# Patient Record
Sex: MALE
[De-identification: names, ages, dates, MRNs, and addresses within clinical notes are randomized per-mention and may not be internally consistent; named-entity substitution may affect disease eponyms.]

---

## 2018-02-14 NOTE — CT
CT ABDOMEN AND PELVIS WITH IV CONTRAST

2/14/18

 

HISTORY: 

Right sided abdominal pain and painful urination. 

 

FINDINGS:  

There is mild to moderate right hydronephrosis and hydroureter with an obstructing calculus seen at t
he most distal aspect of the right UVJ measuring 5 mm. Just proximal to this larger calculus within t
he distal right ureter is a 4 mm calculus present. There are a few nonobstructing right renal calculi
 identified. Largest within the renal pelvis which measures approximately 11 mm x 5 mm. 

 

There is mild right perinephric stranding.

 

No left renal or ureteral calculus is visualized. Vascular calcifications are seen in the coronary ar
teries and to a lesser extent infrarenal abdominal aorta. 

 

Calcified subcarinal lymph node is seen. 

 

There is mild elevation of the right hemidiaphragm with atelectasis present at the right lung base. T
he left lung base is clear. 

 

Degenerative changes are noted in the spine. Calcified granulomata are seen in the spleen. 

 

The liver, pancreas, bilateral adrenal glands and left kidney demonstrate a normal CT appearance.

 

There is colonic diverticulosis. 

 

The appendix is not visualized, but there are no secondary signs to suggest appendicitis. 

 

No free fluid, fluid collection, or lymphadenopathy seen in the abdomen or pelvis. 

 

IMPRESSION:  

1.      Partially obstructing approximately 5 mm calculus at the right UVJ with a 4 mm calculus seen 
in the distal right ureter. There is mild to moderate right hydronephrosis. 

2.      Nonobstructing right renal calculi. 

3.      Colonic diverticulosis.

1.      

 

POS: [facility]

## 2019-03-20 ENCOUNTER — HOSPITAL ENCOUNTER (EMERGENCY)
Dept: HOSPITAL 92 - ERS | Age: 59
Discharge: HOME | End: 2019-03-20
Payer: MEDICARE

## 2019-03-20 DIAGNOSIS — I10: ICD-10-CM

## 2019-03-20 DIAGNOSIS — Z79.899: ICD-10-CM

## 2019-03-20 DIAGNOSIS — E11.9: ICD-10-CM

## 2019-03-20 DIAGNOSIS — Z79.84: ICD-10-CM

## 2019-03-20 DIAGNOSIS — E78.5: ICD-10-CM

## 2019-03-20 DIAGNOSIS — M23.92: Primary | ICD-10-CM

## 2019-03-20 DIAGNOSIS — F41.9: ICD-10-CM

## 2019-03-20 PROCEDURE — 96374 THER/PROPH/DIAG INJ IV PUSH: CPT

## 2019-03-20 NOTE — RAD
LEFT KNEE FOUR VIEWS:

3/20/19

 

HISTORY: 

Injury left knee pain. 

 

FINDINGS/IMPRESSION:  

Degenerative changes are seen. No acute fracture or dislocation is identified. There is fullness in t
he suprapatellar pouch suspicious for a joint effusion. 

 

 

 

POS: [facility]

## 2019-07-03 ENCOUNTER — HOSPITAL ENCOUNTER (EMERGENCY)
Dept: HOSPITAL 92 - ERS | Age: 59
Discharge: HOME | End: 2019-07-03
Payer: MEDICARE

## 2019-07-03 DIAGNOSIS — Z87.442: ICD-10-CM

## 2019-07-03 DIAGNOSIS — R00.0: ICD-10-CM

## 2019-07-03 DIAGNOSIS — E78.5: ICD-10-CM

## 2019-07-03 DIAGNOSIS — I10: ICD-10-CM

## 2019-07-03 DIAGNOSIS — G51.0: ICD-10-CM

## 2019-07-03 DIAGNOSIS — R10.9: ICD-10-CM

## 2019-07-03 DIAGNOSIS — F41.9: ICD-10-CM

## 2019-07-03 DIAGNOSIS — Z79.899: ICD-10-CM

## 2019-07-03 DIAGNOSIS — R07.89: Primary | ICD-10-CM

## 2019-07-03 LAB
ALBUMIN SERPL BCG-MCNC: 3.9 G/DL (ref 3.5–5)
ALP SERPL-CCNC: 92 U/L (ref 40–150)
ALT SERPL W P-5'-P-CCNC: 85 U/L (ref 8–55)
ANION GAP SERPL CALC-SCNC: 15 MMOL/L (ref 10–20)
AST SERPL-CCNC: 128 U/L (ref 5–34)
BASOPHILS # BLD AUTO: 0 THOU/UL (ref 0–0.2)
BASOPHILS NFR BLD AUTO: 0.3 % (ref 0–1)
BILIRUB SERPL-MCNC: 1 MG/DL (ref 0.2–1.2)
BUN SERPL-MCNC: 13 MG/DL (ref 8.4–25.7)
CALCIUM SERPL-MCNC: 10.3 MG/DL (ref 7.8–10.44)
CHLORIDE SERPL-SCNC: 103 MMOL/L (ref 98–107)
CO2 SERPL-SCNC: 25 MMOL/L (ref 22–29)
CREAT CL PREDICTED SERPL C-G-VRATE: 0 ML/MIN (ref 70–130)
EOSINOPHIL # BLD AUTO: 0.2 THOU/UL (ref 0–0.7)
EOSINOPHIL NFR BLD AUTO: 1.4 % (ref 0–10)
GLOBULIN SER CALC-MCNC: 3.1 G/DL (ref 2.4–3.5)
GLUCOSE SERPL-MCNC: 188 MG/DL (ref 70–105)
HGB BLD-MCNC: 17.5 G/DL (ref 14–18)
LYMPHOCYTES # BLD: 0.9 THOU/UL (ref 1.2–3.4)
LYMPHOCYTES NFR BLD AUTO: 8.6 % (ref 21–51)
MCH RBC QN AUTO: 30.1 PG (ref 27–31)
MCV RBC AUTO: 89.5 FL (ref 78–98)
MONOCYTES # BLD AUTO: 0.7 THOU/UL (ref 0.11–0.59)
MONOCYTES NFR BLD AUTO: 6.2 % (ref 0–10)
NEUTROPHILS # BLD AUTO: 8.9 THOU/UL (ref 1.4–6.5)
NEUTROPHILS NFR BLD AUTO: 83.4 % (ref 42–75)
PLATELET # BLD AUTO: 137 THOU/UL (ref 130–400)
POTASSIUM SERPL-SCNC: 4.2 MMOL/L (ref 3.5–5.1)
RBC # BLD AUTO: 5.83 MILL/UL (ref 4.7–6.1)
SODIUM SERPL-SCNC: 139 MMOL/L (ref 136–145)
WBC # BLD AUTO: 10.6 THOU/UL (ref 4.8–10.8)

## 2019-07-03 PROCEDURE — 96360 HYDRATION IV INFUSION INIT: CPT

## 2019-07-03 PROCEDURE — 71046 X-RAY EXAM CHEST 2 VIEWS: CPT

## 2019-07-03 PROCEDURE — 84484 ASSAY OF TROPONIN QUANT: CPT

## 2019-07-03 PROCEDURE — 71275 CT ANGIOGRAPHY CHEST: CPT

## 2019-07-03 PROCEDURE — 93005 ELECTROCARDIOGRAM TRACING: CPT

## 2019-07-03 PROCEDURE — 85025 COMPLETE CBC W/AUTO DIFF WBC: CPT

## 2019-07-03 PROCEDURE — 36415 COLL VENOUS BLD VENIPUNCTURE: CPT

## 2019-07-03 PROCEDURE — 80053 COMPREHEN METABOLIC PANEL: CPT

## 2019-07-03 NOTE — CT
EXAM: CTA of the chest and abdomen



HISTORY: Pain in the gut and neck for 1 to 2 weeks.



COMPARISON: None



TECHNIQUE: Multiple contiguous axial images were obtained a CTA of the chest and abdomen with contras
t per aortic dissection protocol. Sagittal and coronal 3-D MIP reformats were performed.



FINDINGS:

HEART: Normal in size without focal cardiac abnormality. Calcifications in the coronary arteries.

PULMONARY ARTERIES: Normal in caliber without filling defects to suggest pulmonary emboli.

MEDIASTINUM: No hilar or mediastinal lymphadenopathy.

LUNGS: No focal infiltrates or masses.

PLEURAL SPACE: No pleural effusion or pneumothorax.



CHEST AND ABDOMINAL WALL SOFT TISSUES: Unremarkable



LIVER: Unremarkable.

GALLBLADDER: Unremarkable.

KIDNEYS: 9 mm nonobstructing right renal calcification.

ADRENAL GLANDS: 1 mm fatty left adrenal mass may represent a myelolipoma.

SPLEEN: Unremarkable.

PANCREAS: Unremarkable.



BOWEL: Scattered diverticula in the left colon..

RETROPERITONEUM: No lymphadenopathy

BONES: Degenerative changes in the spine.



ASCENDING THORACIC AORTA:  Normal caliber without evidence of dissection or aneurysmal dilatation.

DESCENDING THORACIC AORTA:  Normal caliber without evidence of dissection or aneurysmal dilatation.

ABDOMINAL AORTA: Normal caliber without evidence of dissection or aneurysmal dilatation.

CELIAC TRUNK: Patent

SMA: Patent

ELMER: Patent

RENAL ARTERIES: Bilateral single renal arteries without significant atherosclerotic disease



IMPRESSION:



1. No evidence of thoracic or abdominal aortic aneurysm or dissection

2. Nonobstructing right renal calcification

3. Possible left adrenal myelolipoma



Reported By: Paresh Hutson 

Electronically Signed:  7/3/2019 6:32 PM

## 2019-07-03 NOTE — RAD
EXAM:

Chest 2 views:



HISTORY:

Chest pain



COMPARISON:

None.



FINDINGS:

There is a normal-sized cardiomediastinal silhouette. There is no evidence of consolidation, mass, or
 pleural effusion.   Degenerative changes are seen in the spine.



IMPRESSION:

No evidence of acute cardiopulmonary disease



Reported By: Paresh Hutson 

Electronically Signed:  7/3/2019 5:34 PM

## 2019-07-06 NOTE — EKG
Test Reason : 

Blood Pressure : ***/*** mmHG

Vent. Rate : 126 BPM     Atrial Rate : 126 BPM

   P-R Int : 136 ms          QRS Dur : 088 ms

    QT Int : 326 ms       P-R-T Axes : 040 014 022 degrees

   QTc Int : 472 ms

 

Sinus tachycardia

Inferior infarct , age undetermined

Abnormal ECG

 

Confirmed by GOLDBERG M.D., ADRIANA ([id number]),  ALEJANDRINA HAWK ([id number]) on 7/6/2019 9:35:24 PM

 

Referred By:             Confirmed By:ADRIANA GOLDBERG M.D.

## 2019-08-13 ENCOUNTER — HOSPITAL ENCOUNTER (OUTPATIENT)
Dept: HOSPITAL 92 - BICULT | Age: 59
Discharge: HOME | End: 2019-08-13
Attending: FAMILY MEDICINE
Payer: MEDICARE

## 2019-08-13 DIAGNOSIS — K76.0: ICD-10-CM

## 2019-08-13 DIAGNOSIS — R94.5: Primary | ICD-10-CM

## 2019-08-13 PROCEDURE — 83036 HEMOGLOBIN GLYCOSYLATED A1C: CPT

## 2019-08-13 PROCEDURE — 80061 LIPID PANEL: CPT

## 2019-08-13 PROCEDURE — 76705 ECHO EXAM OF ABDOMEN: CPT

## 2019-08-13 PROCEDURE — 80053 COMPREHEN METABOLIC PANEL: CPT

## 2019-08-13 PROCEDURE — 36415 COLL VENOUS BLD VENIPUNCTURE: CPT

## 2019-08-13 NOTE — ULT
RIGHT UPPER QUADRANT ULTRASOUND:

8/13/19

 

HISTORY: 

Elevated LFTs. 

 

FINDINGS: 

The gallbladder is not satisfactorily visualized due to bowel gas. No definite gallstones, gallbladde
r wall thickening or  pericholecystic fluid is seen.  The common duct measures 5 mm in diameter.

 

The liver demonstrates increased echogenicity consistent with fatty infiltration. The right kidney is
 normal. The pancreas is not visualized due to overlying bowel gas. No free fluid is seen in the righ
t upper quadrant.

 

IMPRESSION: 

Fatty liver. 

 

POS: [facility]

## 2019-10-31 ENCOUNTER — HOSPITAL ENCOUNTER (EMERGENCY)
Dept: HOSPITAL 92 - ERS | Age: 59
Discharge: HOME | End: 2019-10-31
Payer: MEDICARE

## 2019-10-31 DIAGNOSIS — E78.5: ICD-10-CM

## 2019-10-31 DIAGNOSIS — F41.9: ICD-10-CM

## 2019-10-31 DIAGNOSIS — E78.00: ICD-10-CM

## 2019-10-31 DIAGNOSIS — Z79.899: ICD-10-CM

## 2019-10-31 DIAGNOSIS — Z79.84: ICD-10-CM

## 2019-10-31 DIAGNOSIS — E11.9: ICD-10-CM

## 2019-10-31 DIAGNOSIS — I10: ICD-10-CM

## 2019-10-31 DIAGNOSIS — N20.1: Primary | ICD-10-CM

## 2019-10-31 LAB
ALBUMIN SERPL BCG-MCNC: 3.3 G/DL (ref 3.4–4.8)
ALP SERPL-CCNC: 69 U/L (ref 40–110)
ALT SERPL W P-5'-P-CCNC: 46 U/L (ref 8–55)
ANION GAP SERPL CALC-SCNC: 12 MMOL/L (ref 10–20)
AST SERPL-CCNC: 57 U/L (ref 5–34)
BASOPHILS # BLD AUTO: 0.1 THOU/UL (ref 0–0.2)
BASOPHILS NFR BLD AUTO: 0.7 % (ref 0–1)
BILIRUB SERPL-MCNC: 0.8 MG/DL (ref 0.2–1.2)
BUN SERPL-MCNC: 12 MG/DL (ref 8.4–25.7)
CALCIUM SERPL-MCNC: 8.4 MG/DL (ref 7.8–10.44)
CHLORIDE SERPL-SCNC: 101 MMOL/L (ref 98–107)
CO2 SERPL-SCNC: 28 MMOL/L (ref 23–31)
CREAT CL PREDICTED SERPL C-G-VRATE: 0 ML/MIN (ref 70–130)
EOSINOPHIL # BLD AUTO: 0.1 THOU/UL (ref 0–0.7)
EOSINOPHIL NFR BLD AUTO: 1.1 % (ref 0–10)
GLOBULIN SER CALC-MCNC: 2.3 G/DL (ref 2.4–3.5)
GLUCOSE SERPL-MCNC: 359 MG/DL (ref 80–115)
HGB BLD-MCNC: 16.1 G/DL (ref 14–18)
LIPASE SERPL-CCNC: 28 U/L (ref 8–78)
LYMPHOCYTES # BLD: 1.6 THOU/UL (ref 1.2–3.4)
LYMPHOCYTES NFR BLD AUTO: 18.4 % (ref 21–51)
MCH RBC QN AUTO: 30 PG (ref 27–31)
MCV RBC AUTO: 90 FL (ref 78–98)
MONOCYTES # BLD AUTO: 0.6 THOU/UL (ref 0.11–0.59)
MONOCYTES NFR BLD AUTO: 7.2 % (ref 0–10)
NEUTROPHILS # BLD AUTO: 6.4 THOU/UL (ref 1.4–6.5)
NEUTROPHILS NFR BLD AUTO: 72.6 % (ref 42–75)
PLATELET # BLD AUTO: 132 THOU/UL (ref 130–400)
POTASSIUM SERPL-SCNC: 4.1 MMOL/L (ref 3.5–5.1)
PROT UR STRIP.AUTO-MCNC: 20 MG/DL
RBC # BLD AUTO: 5.37 MILL/UL (ref 4.7–6.1)
RBC UR QL AUTO: (no result) HPF (ref 0–3)
SODIUM SERPL-SCNC: 137 MMOL/L (ref 136–145)
WBC # BLD AUTO: 8.9 THOU/UL (ref 4.8–10.8)
WBC UR QL AUTO: (no result) HPF (ref 0–3)

## 2019-10-31 PROCEDURE — 85025 COMPLETE CBC W/AUTO DIFF WBC: CPT

## 2019-10-31 PROCEDURE — 81015 MICROSCOPIC EXAM OF URINE: CPT

## 2019-10-31 PROCEDURE — 96374 THER/PROPH/DIAG INJ IV PUSH: CPT

## 2019-10-31 PROCEDURE — 96375 TX/PRO/DX INJ NEW DRUG ADDON: CPT

## 2019-10-31 PROCEDURE — 74176 CT ABD & PELVIS W/O CONTRAST: CPT

## 2019-10-31 PROCEDURE — 96361 HYDRATE IV INFUSION ADD-ON: CPT

## 2019-10-31 PROCEDURE — 83690 ASSAY OF LIPASE: CPT

## 2019-10-31 PROCEDURE — 36415 COLL VENOUS BLD VENIPUNCTURE: CPT

## 2019-10-31 PROCEDURE — 80053 COMPREHEN METABOLIC PANEL: CPT

## 2019-10-31 PROCEDURE — 81003 URINALYSIS AUTO W/O SCOPE: CPT

## 2019-10-31 PROCEDURE — 87086 URINE CULTURE/COLONY COUNT: CPT

## 2019-10-31 NOTE — CT
CT ABDOMEN AND PELVIS NONCONTRAST:

 

HISTORY: 

Right flank pain.

 

FINDINGS: 

There is mild distention of the right renal collecting system and ureter to the level of a 0.5 cm seth
culus near the right ureterovesicular junction.

 

An additional irregular-shaped calcification is present within the right renal pelvis measuring up to
 1.5 cm diameter.  No stones are apparent on the left.  Left renal collecting system and ureter are d
ecompressed.  

 

Lack of contrast limits evaluation for other abnormalities.  Calcified mediastinal lymph nodes are co
nsistent with healed granulomatous disease.  Prominent degenerative changes lumbar spine.  Diverticul
a arise from the colon without adjacent inflammation.  Tiny left adrenal myelolipoma is stable.

 

IMPRESSION: 

1.  Partial obstruction at a 5 mm right ureterovesicular junction calculus.

 

2.  Additional larger right renal calculus.

 

3.  Diverticulosis.  No evidence of diverticulitis.

 

POS: [facility]

## 2020-02-06 ENCOUNTER — HOSPITAL ENCOUNTER (OUTPATIENT)
Dept: HOSPITAL 92 - SCSCT | Age: 60
Discharge: HOME | End: 2020-02-06
Attending: UROLOGY
Payer: MEDICARE

## 2020-02-06 DIAGNOSIS — N13.0: ICD-10-CM

## 2020-02-06 DIAGNOSIS — N20.0: ICD-10-CM

## 2020-02-06 DIAGNOSIS — R81: ICD-10-CM

## 2020-02-06 DIAGNOSIS — E11.9: ICD-10-CM

## 2020-02-06 DIAGNOSIS — Z12.5: Primary | ICD-10-CM

## 2020-02-06 PROCEDURE — 36415 COLL VENOUS BLD VENIPUNCTURE: CPT

## 2020-02-06 PROCEDURE — 80048 BASIC METABOLIC PNL TOTAL CA: CPT

## 2020-02-06 PROCEDURE — 81001 URINALYSIS AUTO W/SCOPE: CPT

## 2020-02-06 PROCEDURE — G0103 PSA SCREENING: HCPCS

## 2020-02-06 PROCEDURE — 87086 URINE CULTURE/COLONY COUNT: CPT

## 2020-02-06 PROCEDURE — 83036 HEMOGLOBIN GLYCOSYLATED A1C: CPT

## 2020-02-06 PROCEDURE — 74176 CT ABD & PELVIS W/O CONTRAST: CPT

## 2020-02-06 NOTE — CT
CT abdomen and pelvis noncontrast



HISTORY: Right flank pain. Stone. Follow-up.



COMPARISON: [facility] 2/4/2020.



FINDINGS: Mild distention of the right renal collecting system and ureter has decreased, extending to
 the level of a calcification at the ureterovesicular junction that now measures 0.6 cm length by

0.4 cm width. The oval calculus within a calyx at the inferior pole of the right kidney now measures 
up to 1.3 cm length on the coronal reformatted images.



Left renal collecting system and ureter are decompressed without stone evident.



Lack of contrast limits evaluation for other abnormalities. Prominent degenerative changes with centr
al canal stenosis evident at the L2-3 level. Tiny angiomyolipoma of the medial limb left adrenal

gland is stable. No evidence of bowel obstruction.











IMPRESSION: Hydroureteronephrosis associated with the partially obstructing right UVJ calculus has de
creased since the previous exam, now only mild. Stone now measures up to 6 x 4 mm.



Nonobstructing large right renal calculus is stable.



Reported By: LEON Young 

Electronically Signed:  2/6/2020 3:37 PM

## 2020-02-10 ENCOUNTER — HOSPITAL ENCOUNTER (OUTPATIENT)
Dept: HOSPITAL 92 - LABBT | Age: 60
Discharge: HOME | End: 2020-02-10
Attending: UROLOGY
Payer: MEDICARE

## 2020-02-10 DIAGNOSIS — Z01.818: Primary | ICD-10-CM

## 2020-02-10 DIAGNOSIS — N20.1: ICD-10-CM

## 2020-02-10 LAB
APTT PPP: 25.4 SEC (ref 22.9–36.1)
HGB BLD-MCNC: 16.4 G/DL (ref 14–18)
INR PPP: 0.9
MCH RBC QN AUTO: 31.1 PG (ref 27–31)
MCV RBC AUTO: 90.2 FL (ref 78–98)
PLATELET # BLD AUTO: 149 THOU/UL (ref 130–400)
PROTHROMBIN TIME: 12.4 SEC (ref 12–14.7)
RBC # BLD AUTO: 5.27 MILL/UL (ref 4.7–6.1)
WBC # BLD AUTO: 9.1 THOU/UL (ref 4.8–10.8)

## 2020-02-10 PROCEDURE — 85730 THROMBOPLASTIN TIME PARTIAL: CPT

## 2020-02-10 PROCEDURE — 93010 ELECTROCARDIOGRAM REPORT: CPT

## 2020-02-10 PROCEDURE — 85610 PROTHROMBIN TIME: CPT

## 2020-02-10 PROCEDURE — 93005 ELECTROCARDIOGRAM TRACING: CPT

## 2020-02-10 PROCEDURE — 85027 COMPLETE CBC AUTOMATED: CPT

## 2020-02-17 ENCOUNTER — HOSPITAL ENCOUNTER (OUTPATIENT)
Dept: HOSPITAL 92 - SDC | Age: 60
Discharge: HOME | End: 2020-02-17
Attending: UROLOGY
Payer: MEDICARE

## 2020-02-17 VITALS — BODY MASS INDEX: 44.1 KG/M2

## 2020-02-17 DIAGNOSIS — Z87.442: ICD-10-CM

## 2020-02-17 DIAGNOSIS — E11.9: ICD-10-CM

## 2020-02-17 DIAGNOSIS — E66.01: ICD-10-CM

## 2020-02-17 DIAGNOSIS — E78.5: ICD-10-CM

## 2020-02-17 DIAGNOSIS — I10: ICD-10-CM

## 2020-02-17 DIAGNOSIS — Z79.84: ICD-10-CM

## 2020-02-17 DIAGNOSIS — Z79.899: ICD-10-CM

## 2020-02-17 DIAGNOSIS — N13.2: Primary | ICD-10-CM

## 2020-02-17 DIAGNOSIS — Z88.0: ICD-10-CM

## 2020-02-17 PROCEDURE — 0TF68ZZ FRAGMENTATION IN RIGHT URETER, VIA NATURAL OR ARTIFICIAL OPENING ENDOSCOPIC: ICD-10-PCS | Performed by: UROLOGY

## 2020-02-17 PROCEDURE — 88300 SURGICAL PATH GROSS: CPT

## 2020-02-17 PROCEDURE — C1758 CATHETER, URETERAL: HCPCS

## 2020-02-17 PROCEDURE — 74420 UROGRAPHY RTRGR +-KUB: CPT

## 2020-02-17 PROCEDURE — C1769 GUIDE WIRE: HCPCS

## 2020-02-17 PROCEDURE — 74018 RADEX ABDOMEN 1 VIEW: CPT

## 2020-02-17 PROCEDURE — 82365 CALCULUS SPECTROSCOPY: CPT

## 2020-02-17 PROCEDURE — 0T768DZ DILATION OF RIGHT URETER WITH INTRALUMINAL DEVICE, VIA NATURAL OR ARTIFICIAL OPENING ENDOSCOPIC: ICD-10-PCS | Performed by: UROLOGY

## 2020-02-17 NOTE — OP
DATE OF PROCEDURE:  02/17/2020



PRIMARY CARE PHYSICIAN:  Daniel Villar MD



PREOPERATIVE DIAGNOSES:  

1. A 59-year-old morbidly obese male with history of right mid pole 1.3 cm 
stone.

2. Right distal ureteral calculi, 6 mm with non-progression.

3. History of recurrent kidney stone.



POSTOPERATIVE DIAGNOSES:  

1. A 59-year-old morbidly obese male with history of right mid pole 1.3 cm 
stone.

2. Right distal ureteral calculi, 6 mm with non-progression.

3. History of recurrent kidney stone.



PROCEDURES PERFORMED:  Cystoscopy, right retrograde pyelogram, balloon 
dilatation of

the right distal ureter, rigid and flexible ureteroscopy, pyeloscopy, laser

lithotripsy, basket extraction of ureteral and large renal calculi, retrograde

pyelogram, 6 x 28 double-J ureteral stent placement with distal tail in situ. 



ANESTHESIA:  General.



COMPLICATIONS:  None apparent.



ESTIMATED BLOOD LOSS:  None.



IV FLUIDS:  1300 mL.



SPECIMEN:  Stone fragments for chemical analysis.



INDICATIONS FOR PROCEDURE AND HISTORY:  Mr. Pereyra is a 59-year-old morbidly

obese diabetic male, disabled, who was referred to me for right ureteral 
calculi.

Followup CT demonstrated persistent ureteral calculi with non-progression since

October 2019.  Therefore, he was advised regarding surgical intervention.  He 
also

has a large right renal calculi measuring 1.3 cm.  Advised regarding treatment

options for both renal and ureteral calculi.  Risks and complications of the

procedure were reviewed with him in detail including, but not limited to 
bleeding,

pain, infection, injury to adjacent organs, urosepsis, possible secondary 
procedure,

ureteral or renal injury, and stricture formation were reviewed.  All questions 
were

answered to his satisfaction.  He desired to proceed.  Increased risk of

morbidity/mortality and infection complication due to poorly-controlled 
diabetes was

reviewed with him in detail.  His preoperative urine culture is negative. 



DESCRIPTION OF PROCEDURE:  After an informed consent was signed, the patient was

taken to the operating room and placed in a dorsal lithotomy position with the

genital area prepped and draped in the usual surgical sterile fashion.  A 21-
Chadian

cystoscope was utilized for cystoscopy, which demonstrated normal anterior and

posterior urethra.  Bilateral lobes of the prostate were coapting; however, no

significant obstruction.  Bladder was entered, which demonstrated normal bladder

mucosa.  The ureteral orifices were identified in normal anatomical location.  
At

this time, a 0.035 Sensor wire was placed through an open-ended catheter.  We

performed a retrograde pyelogram first; however, there was distal dilatation of 
the

ureter consistent with hydronephrosis.  We carefully negotiated a 0.035 Sensor 
wire

into the right upper pole.  Subsequently, we dilated the intramural ureter with 
a

12-Chadian 4-cm balloon dilator uneventfully.  Subsequently, we passed a rigid

ureteroscope without significant issues.  At the area of the previous stone, 
there

was mucosal edema consistent with an impacted obstructing ureteral stone.  The 
stone

was just above this, and we pushed the stone proximally rendering the stone 
free as

he was dilated proximally due to chronically obstructing ureteral calculi.  We 
laser

lithotripsied the stone into multiple tiny pieces and extracted the fragments 
with a

Zero Tip Nitinol Baskets rendering the ureteral calculi free.  At this time, a

10-Chadian dual-lumen Access sheath was passed through the safety wire, and a 
second

working wire, 0.035 Super Stiff was placed into the right upper pole.  A 10-
Chadian

dual-lumen Access sheath was easily maneuvered to the proximal ureter.  At this

time, an 11/13-Chadian x 40 cm navigator was passed without significant issues 
in the

proximal ureter.  A flexible ureteroscope was utilized to survey the collecting

system demonstrating a large right mid pole stone.  Using 273-micron ball-tip 
fiber

with energy of 1.6 joules in a dust setting, we laser fragmented the large renal

pelvic stone into tiny fragments.  Most of the stone broke into tiny dustlike

debris.  There was residual stone in the mid to the upper pole, which we basket

extracted.  At the end of the procedure, what remained was dustlike stone debris
,

not of clinical significance.  We surveyed the ureter, which demonstrated no

evidence of further stone nidus or ureteral mucosa trauma.  Again, noted in the

distal ureter the area of the distal ureteral stone, which is non-progressive, 
but

bullous edema consistent with obstructing impacting stone.  A 6 x 26 double-J

ureteral stent was passed over the safety wire, and the wire was subsequently

removed.  All wires were removed, and the stent was placed without significant

issues into the right collecting system.  Bladder was completely emptied, and he

tolerated the procedure well.  He will follow up with me next Thursday for 
cysto and

stent pull.  He is discharged.  Azo p.r.n., Colace p.r.n., Flomax 0.4 mg 1 p.o.

daily, Levaquin x10 days, tramadol 50 one to two p.o. q.6 to 8 hours, #40. 

kub one day before appt







Job ID:  [id number]



[facility]

## 2020-02-17 NOTE — RAD
KUB:

 

COMPARISON: 

None.

 

HISTORY: 

Preoperative radiograph for kidney stones.

 

FINDINGS: 

A single view of the abdomen shows a nonspecific, nonobstructed bowel gas pattern.  There is a calcif
ication projecting over the right renal shadow measuring approximately 1.6 cm in size.  There is a qu
estionable calcification projecting over the left renal shadow which is obscured by bowel gas and olman
sures smaller than the other side.  Degenerative changes are seen in the spine.

 

IMPRESSION: 

Right nephrolithiasis.

 

POS: [facility]

## 2020-02-17 NOTE — RAD
RETROGRADE PYELOGRAM:

 

HISTORY:  

Right ureteral stone. 

 

FINDINGS:

A series of four images show placement of a right ureteral stent which is in good position. 

 

IMPRESSION: 

Placement of right ureteral stent in good position. 

 

 

POS: [facility]

## 2020-02-26 ENCOUNTER — HOSPITAL ENCOUNTER (OUTPATIENT)
Dept: HOSPITAL 92 - LABBT | Age: 60
Discharge: HOME | End: 2020-02-26
Attending: UROLOGY
Payer: MEDICARE

## 2020-02-26 DIAGNOSIS — Z96.0: ICD-10-CM

## 2020-02-26 DIAGNOSIS — N20.0: Primary | ICD-10-CM

## 2020-02-26 PROCEDURE — 74018 RADEX ABDOMEN 1 VIEW: CPT

## 2020-02-26 NOTE — RAD
ABDOMEN ONE VIEW:

2/26/20

 

INDICATION:

Stent placement. 

 

COMPARISON: 

Prior exam dated 2/17/20. Retrograde evaluation dated 2/17/20.

 

FINDINGS: 

Since the comparison examination, the right sided ureteral stent is unchanged in position. No suspici
ous calcification seen along the course of the ureteral stent. Small residual calcification is seen a
long the superior pole of the right kidney. No suspicious calcification overlying the left kidney. Imani
ng bases are clear. Bowel gas pattern is unobstructed.

 

IMPRESSION: 

1.      Small 6 mm residual stone fragment seen involving the superior pole of the right kidney. 

2.      Right ureteral stent projects in the expected position. 

 

POS: [facility]

## 2020-10-28 ENCOUNTER — HOSPITAL ENCOUNTER (OUTPATIENT)
Dept: HOSPITAL 92 - ERS | Age: 60
Setting detail: OBSERVATION
LOS: 1 days | Discharge: HOME | End: 2020-10-29
Attending: HOSPITALIST | Admitting: HOSPITALIST
Payer: MEDICARE

## 2020-10-28 ENCOUNTER — HOSPITAL ENCOUNTER (EMERGENCY)
Dept: HOSPITAL 57 - BURERS | Age: 60
Discharge: ANOTHER HOSPITAL | End: 2020-10-28
Payer: MEDICARE

## 2020-10-28 VITALS — BODY MASS INDEX: 46.7 KG/M2

## 2020-10-28 DIAGNOSIS — Z79.84: ICD-10-CM

## 2020-10-28 DIAGNOSIS — E11.9: ICD-10-CM

## 2020-10-28 DIAGNOSIS — I10: ICD-10-CM

## 2020-10-28 DIAGNOSIS — E66.9: ICD-10-CM

## 2020-10-28 DIAGNOSIS — R29.810: Primary | ICD-10-CM

## 2020-10-28 DIAGNOSIS — Z79.899: ICD-10-CM

## 2020-10-28 DIAGNOSIS — Z20.828: ICD-10-CM

## 2020-10-28 DIAGNOSIS — E78.00: ICD-10-CM

## 2020-10-28 DIAGNOSIS — G51.0: Primary | ICD-10-CM

## 2020-10-28 DIAGNOSIS — E78.5: ICD-10-CM

## 2020-10-28 DIAGNOSIS — Z88.0: ICD-10-CM

## 2020-10-28 DIAGNOSIS — Z86.73: ICD-10-CM

## 2020-10-28 DIAGNOSIS — F41.9: ICD-10-CM

## 2020-10-28 LAB
ALBUMIN SERPL BCG-MCNC: 3.4 G/DL (ref 3.4–4.8)
ALP SERPL-CCNC: 81 U/L (ref 40–110)
ALT SERPL W P-5'-P-CCNC: 38 U/L (ref 8–55)
ANION GAP SERPL CALC-SCNC: 15 MMOL/L (ref 10–20)
APAP SERPL-MCNC: (no result) MCG/ML (ref 10–30)
AST SERPL-CCNC: 43 U/L (ref 5–34)
BASOPHILS # BLD AUTO: 0.1 THOU/UL (ref 0–0.2)
BASOPHILS NFR BLD AUTO: 1.6 % (ref 0–1)
BILIRUB SERPL-MCNC: 0.6 MG/DL (ref 0.2–1.2)
BUN SERPL-MCNC: 13 MG/DL (ref 8.4–25.7)
CALCIUM SERPL-MCNC: 8.6 MG/DL (ref 7.8–10.44)
CHLORIDE SERPL-SCNC: 103 MMOL/L (ref 98–107)
CO2 SERPL-SCNC: 27 MMOL/L (ref 23–31)
CREAT CL PREDICTED SERPL C-G-VRATE: 0 ML/MIN (ref 70–130)
DRUG SCREEN CUTOFF: (no result)
EOSINOPHIL # BLD AUTO: 0.3 THOU/UL (ref 0–0.7)
EOSINOPHIL NFR BLD AUTO: 3.3 % (ref 0–10)
GIANT PLATELETS BLD QL SMEAR: SLIGHT
GLOBULIN SER CALC-MCNC: 2.9 G/DL (ref 2.4–3.5)
GLUCOSE SERPL-MCNC: 359 MG/DL (ref 80–115)
GLUCOSE UR STRIP-MCNC: 500 MG/DL
GLUCOSE UR STRIP-MCNC: >=1000 MG/DL
HGB BLD-MCNC: 15.6 G/DL (ref 14–18)
LYMPHOCYTES # BLD AUTO: 1.6 THOU/UL (ref 1.2–3.4)
LYMPHOCYTES NFR BLD AUTO: 20.7 % (ref 21–51)
MCH RBC QN AUTO: 29.1 PG (ref 27–31)
MCV RBC AUTO: 90 FL (ref 78–98)
MDIFF COMPLETE?: YES
MEDTOX CONTROL LINE VALID?: (no result)
MEDTOX READER #: (no result)
MONOCYTES # BLD AUTO: 0.6 THOU/UL (ref 0.11–0.59)
MONOCYTES NFR BLD AUTO: 7.5 % (ref 0–10)
NEUTROPHILS # BLD AUTO: 5.2 THOU/UL (ref 1.4–6.5)
NEUTROPHILS NFR BLD AUTO: 66.9 % (ref 42–75)
PLATELET # BLD AUTO: 102 THOU/UL (ref 130–400)
POTASSIUM SERPL-SCNC: 4.1 MMOL/L (ref 3.5–5.1)
PROT UR STRIP.AUTO-MCNC: 10 MG/DL
RBC # BLD AUTO: 5.37 MILL/UL (ref 4.7–6.1)
SALICYLATES SERPL-MCNC: (no result) MG/DL (ref 15–30)
SODIUM SERPL-SCNC: 141 MMOL/L (ref 136–145)
SP GR UR STRIP: (no result) (ref 1–1.04)
SP GR UR STRIP: 1.02 (ref 1–1.03)
TROPONIN I SERPL DL<=0.01 NG/ML-MCNC: 0.01 NG/ML (ref ?–0.03)
WBC # BLD AUTO: 7.8 THOU/UL (ref 4.8–10.8)

## 2020-10-28 PROCEDURE — 93005 ELECTROCARDIOGRAM TRACING: CPT

## 2020-10-28 PROCEDURE — 83880 ASSAY OF NATRIURETIC PEPTIDE: CPT

## 2020-10-28 PROCEDURE — 80053 COMPREHEN METABOLIC PANEL: CPT

## 2020-10-28 PROCEDURE — 99285 EMERGENCY DEPT VISIT HI MDM: CPT

## 2020-10-28 PROCEDURE — 82962 GLUCOSE BLOOD TEST: CPT

## 2020-10-28 PROCEDURE — 80306 DRUG TEST PRSMV INSTRMNT: CPT

## 2020-10-28 PROCEDURE — 70551 MRI BRAIN STEM W/O DYE: CPT

## 2020-10-28 PROCEDURE — 85025 COMPLETE CBC W/AUTO DIFF WBC: CPT

## 2020-10-28 PROCEDURE — 0042T: CPT

## 2020-10-28 PROCEDURE — 80307 DRUG TEST PRSMV CHEM ANLYZR: CPT

## 2020-10-28 PROCEDURE — 71045 X-RAY EXAM CHEST 1 VIEW: CPT

## 2020-10-28 PROCEDURE — U0003 INFECTIOUS AGENT DETECTION BY NUCLEIC ACID (DNA OR RNA); SEVERE ACUTE RESPIRATORY SYNDROME CORONAVIRUS 2 (SARS-COV-2) (CORONAVIRUS DISEASE [COVID-19]), AMPLIFIED PROBE TECHNIQUE, MAKING USE OF HIGH THROUGHPUT TECHNOLOGIES AS DESCRIBED BY CMS-2020-01-R: HCPCS

## 2020-10-28 PROCEDURE — 70450 CT HEAD/BRAIN W/O DYE: CPT

## 2020-10-28 PROCEDURE — 97116 GAIT TRAINING THERAPY: CPT

## 2020-10-28 PROCEDURE — 96372 THER/PROPH/DIAG INJ SC/IM: CPT

## 2020-10-28 PROCEDURE — 81003 URINALYSIS AUTO W/O SCOPE: CPT

## 2020-10-28 PROCEDURE — 36416 COLLJ CAPILLARY BLOOD SPEC: CPT

## 2020-10-28 PROCEDURE — 36415 COLL VENOUS BLD VENIPUNCTURE: CPT

## 2020-10-28 PROCEDURE — 80061 LIPID PANEL: CPT

## 2020-10-28 PROCEDURE — 70498 CT ANGIOGRAPHY NECK: CPT

## 2020-10-28 PROCEDURE — 94760 N-INVAS EAR/PLS OXIMETRY 1: CPT

## 2020-10-28 PROCEDURE — 97139 UNLISTED THERAPEUTIC PX: CPT

## 2020-10-28 PROCEDURE — 87635 SARS-COV-2 COVID-19 AMP PRB: CPT

## 2020-10-28 PROCEDURE — 84484 ASSAY OF TROPONIN QUANT: CPT

## 2020-10-28 PROCEDURE — 70496 CT ANGIOGRAPHY HEAD: CPT

## 2020-10-28 PROCEDURE — G0378 HOSPITAL OBSERVATION PER HR: HCPCS

## 2020-10-28 NOTE — HP
CHIEF COMPLAINT:  Right-sided weakness and slurred speech.



HISTORY OF PRESENT ILLNESS:  The patient is a 60-year-old male with a past medical

history of hypertension, diabetes, obesity, and hyperlipidemia, who presents to the

hospital with complaints of right-sided droopiness and slurred speech.  The patient

states that Monday night he felt unwell, went to bed, woke up Tuesday.  He initially

had a headache and thought it was most likely allergies.  However, when he woke up

Tuesday morning, he still felt not well, and Tuesday midday, he started noticing

some slurred speech.  However, the patient thought that he attributed it to possible

Bell palsy that he has had on the left side in the past.  He then started to have

worsening symptoms, so he came into the ER this morning for further evaluation. 



The patient denies any fevers, chills, any sick contacts, any nausea, vomiting,

diarrhea, or any chest pain.  He states he is very compliant with his medications. 



PAST MEDICAL HISTORY:  

1. Diabetes, type 2.

2. Hyperlipidemia.

3. Obesity.

4. Bell palsy on the left side.

5. Hypertension.

6. Kidney stones.



PAST SURGICAL HISTORY:  

1. He has had a kidney stone removed.

2. Left shoulder.

3. Left palm surgery.



SOCIAL HISTORY:  He denies any alcohol use, drug use, or smoking history.  He is a

full code.  Lives at home with his son. 



REVIEW OF SYSTEMS:  All negative except for the ones mentioned above in HPI.



FAMILY HISTORY:  No history of heart disease or strokes.



PHYSICAL EXAMINATION:

VITAL SIGNS:  Temperature of 98.0, O2 saturations 96, respirations 18, blood

pressure 115/58, and pulse of 70. 

GENERAL:  He is awake, alert, and oriented x3.  Does not appear in distress. 

CV:  S1 and S2 present.  No murmurs, rubs, or gallops. 

LUNGS:  Clear to auscultation.  No rhonchi or wheezes noted. 

ABDOMEN:  Soft, obese.  Bowel sounds are present x2. 

EXTREMITIES:  No edema.  Pedal pulses are present x2 neurovascular wise. 

NEUROLOGIC:  He does have a significant facial droop on the right side.  He is

unable to raise his right eyebrows compared to the left side.  He is able to lift

his left eyebrow.  His pupils are equal and reactive.  Upper motor strength and

lower motor strength are equal. 

SKIN:  No cuts, lesions, or bruises noted.



LABORATORY DATA:  Troponin is negative.  WBCs of 7.8, hemoglobin of 15.6, hematocrit

of 48.4, and his platelets are 102.  Chemistry:  Sodium of 141, potassium of 4.1,

BUN of 13, creatinine 0.97, and his blood sugar was 359.  His BNP was normal.  The

patient had a CTA and a CT head.  CTA indicated no hemodynamically significant

stenosis, occlusion, or aneurysm formation.  He did have a CT head, which indicated

a lacunar infarct of indeterminate age in the right thalamus.  No acute cortical

infarct or hemorrhages seen. 



ASSESSMENT AND PLAN:  The patient is a very pleasant 60-year-old man who comes into

the hospital with right-sided facial weakness and slurred speech. 

1. Right-sided facial droop.  This could be possible Bell palsy versus stroke.  His

CT head indicated a lacunar right thalamic stroke, which is not consistent with his

symptoms.  He is unable to raise his right eyebrows up and he has significant

drooping on the right side.  We will get an MRI brain.  We will get an

echocardiogram and he already had a CTA.  I will get Neurology to see this patient.

We will start him on aspirin and statin and we will continue to monitor.  However, I

believe this could most likely be Bell palsy. 

2. Obesity.  Asked him to get a sleep study done since he does snore quite a bit.

3. Hypertension.  We will hold off on his blood pressure medications.  His blood

pressure has been stable. 

4. Diabetes.  His hemoglobin A1c was 9.3.  This was done in 09/2020.  I will not 

repeat that.  I will check a lipid panel on him.  We will hold off on his oral

medications and start him on some insulin. 

5. Deep vein thrombosis prophylaxis.  We will put the patient on subcutaneous

heparin. 







Job ID:  [id number]

## 2020-10-28 NOTE — RAD
PORTABLE CHEST: 

10/28/20

 

Comparison is made with the 7/3/19 study. Elevation of the right hemidiaphragm is chronic. The heart 
is normal in size. Some linear streaking in the right base is probably atelectasis. The lungs are oth
erwise clear. The trachea is midline. 

 

IMPRESSION: 

No acute findings of concern. 

 

POS: [facility]

## 2020-10-28 NOTE — CT
EXAM:  CT ANGIOGRAM OF THE HEAD AND NECK



INDICATION: Right facial droop.



COMPARISON: None



TECHNIQUE: CT angiogram of the head and neck are performed in the axial plane. Three-dimensional refo
rmatted images are submitted for interpretation.



FINDINGS:

Note, examination was performed at 12:12 PM on 10/28/2020. Per Dr. Grande, examination is requested 
for interpretation at 3:20 PM on 10/28/2020



CTA OF THE HEAD WITH AND WITHOUT CONTRAST:



POSTCONTRAST CT OF BRAIN:

Pathologic enhancement: No pathologic enhancement the brain.



Postcontrast soft tissue neck CT:

Aerodigestive tract:Aerodigestive tract is patent. No mucosal abnormality.

Sinuses: Adequate aeration of the sinuses and mastoid air cells.

Orbits: Bilateral ocular lenses are appropriately located. Both globes are intact. Retrobulbar fat is
 preserved. Symmetric attenuation the optic nerves and ocular rectus muscles.

Salivary glands:Appropriate attenuation 

Thyroid gland: Appropriate attenuation

Lymph nodes: No evidence of lymphadenopathy by size criteria.

Paraspinal muscles: Symmetric attenuation of the sternocleidomastoid muscles. Appropriate attenuation
 of the paraspinal muscles.

Cervical spine:There are varying degrees of loss of disc space height and osteophyte formation throug
hout the cervical spine. Moderate changes at C6-C7 and C7-T1. Mild to moderate degenerative changes

at C5-C6. Central spinal canal and neural foramina demonstrate stenosis secondary to degenerative dis
c disease. Technique limits evaluation. There is at least moderate central canal stenosis at C5-C6,

C6-C7. 

Upper mediastinum and lung apices: Nonspecific groundglass opacities.



CTA OF THE NECK WITH CONTRAST:

Aorta: Appropriate enhancement and luminal diameter.

Right carotid artery: Appropriate enhancement and luminal diameter of the origin of the right carotid
 artery, innominate artery, carotid bifurcation and internal carotid artery. There is calcified

plaque in the right carotid bifurcation. No significant stenosis based upon NASCET criteria.

Left carotid: Appropriate enhancement and luminal diameter of the origin of the left carotid artery, 
common carotid artery, carotid bifurcation and internal carotid artery. No significant stenosis

based upon NASCET criteria.

Subclavian arteries:Symmetric and patent. 

Vertebral arteries:Patent throughout their course in the neck. Dominant left vertebral artery. 



CTA OF THE BRAIN:

Intracranial internal carotid arteries:Appropriate enhancement and luminal diameter. Atherosclerosis 
in bilateral paraclinoid segments. 

Anterior circulation: Appropriate enhancement and luminal diameter of the A1 segments, proximal A2 se
gments, bilateral M1 segments and proximal MCA branches.

Intracranial vertebral arteries: Patent. Limited evaluation of both PICA artery origins.

Posterior circulation: Both vertebral arteries supply normal caliber basilar artery. Bilateral PCAs h
ave a fetal origin.



IMPRESSION:

No hemodynamically significant stenosis, occlusion or aneurysmal formation.



Results of study discussed with Dr. Grande on 10/28/2020 at 3:28 PM

Code CR





Transcribed Date/Time: 10/28/2020 3:41 PM



Reported By: Romero Cotton 

Electronically Signed:  10/28/2020 3:58 PM

## 2020-10-29 VITALS — TEMPERATURE: 97.8 F | DIASTOLIC BLOOD PRESSURE: 94 MMHG | SYSTOLIC BLOOD PRESSURE: 134 MMHG

## 2020-10-29 LAB
CHD RISK SERPL-RTO: 3.2 (ref ?–4.5)
CHOLEST SERPL-MCNC: 104 MG/DL
HDLC SERPL-MCNC: 33 MG/DL
LDLC SERPL CALC-MCNC: 51 MG/DL
TRIGL SERPL-MCNC: 98 MG/DL (ref ?–150)

## 2020-10-29 RX ADMIN — INSULIN LISPRO PRN UNIT: 100 INJECTION, SOLUTION INTRAVENOUS; SUBCUTANEOUS at 07:15

## 2020-10-29 RX ADMIN — INSULIN LISPRO PRN UNIT: 100 INJECTION, SOLUTION INTRAVENOUS; SUBCUTANEOUS at 12:06

## 2020-10-29 NOTE — CON
NEUROLOGY CONSULTATION



DATE OF CONSULTATION:  10/29/2020



REASON FOR CONSULTATION:  Right facial palsy.



HISTORY OF PRESENT ILLNESS:  Mr. Pereyra is a 60-year-old male with history

significant for hypertension, diabetes, obesity, and hyperlipidemia, presented 
with

right facial droop, slurred speech, some problem with swallowing, and decreased

sensation on the right side of the face, and is unable to close the right eye.  
Per 

patient, he was not feeling well when he went to bed on Tuesday and then woke up

with headache, noticed slurred speech, and then the right facial droop.  The 
patient

thought that he attributed to possible Bell palsy, which he had before on the 
left

side of the face.  The symptoms worsened, so he decided to come to the emergency

room for further evaluation.  The patient denies any focal weakness, focal

paresthesias, nausea, vomiting, chest pain, abdominal pain, vertigo, dizziness, 
loss

of vision, blurred vision, loss of consciousness associated with the episode. 



REVIEW OF SYSTEMS:  All systems were reviewed and were negative except the 
pertinent

positives and negatives mentioned in the HPI. 



PAST MEDICAL HISTORY:  Diabetes, hypertension, obesity, Bell palsy on left side,

hypertension, and kidney stone. 



PAST SURGICAL HISTORY:  Kidney stone removal, left shoulder surgery, and left 
arm

surgery. 



SOCIAL HISTORY:  The patient lives at home with his son.  Denies smoking, 
alcohol,

or illegal drug use. 



FAMILY HISTORY:  No significant family history of heart disease or stroke.



Allergies: No known drug allergies



PHYSICAL EXAMINATION:

VITAL SIGNS:  Blood pressure 115/50, pulse 80, respiratory rate 18. 

CVS:  Regular rate and rhythm. 

CHEST:  Clear. 

ABDOMEN:  Soft. 

NECK:  Supple. 

NEUROLOGIC:  Mental status; the patient is alert and oriented to person, place, 
and

time.  Recent and remote memory intact.  Fund of knowledge is appropriate.  
Speech

is dysarthric.  Cranial nerves 2 through 12 intact except 5, decreased sensation
to

pinprick and light touch in the V1, V2, V3 distribution; and 7, right facial 
droop,

weakness of the right orbicularis oris and orbicularis oculi, unable to raise 
the

eyebrows; and 9 and 10, problem with swallowing and dysarthria. 



Data reviewed: MRI of the brain reviewed which was negative for acute 
intracranial pathology



ASSESSMENT AND PLAN:  Mr. Job Pereyra is a 60-year-old male with history

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  Head 
CT

negative for acute intracranial pathology.   The patient's deficits are 
localized to the face with no focal paresthesias or weakness.

 This is characteristic  upper motor neuron type facial palsy clinically and by 
imaging.  MRI of the brain

reviewed, which was negative for acute intracranial pathology.  Continue neuro

checks every 4 hours.  PT/OT/speech.  Continue home medications.  Strict control
of blood pressure and blood glucose.  

Continue medical management per primary team.   Plan discussed in detail with 
the patient and also with the nursing staff



Thank you for the consult.







Job ID:  [id number]



[facility]

## 2020-10-29 NOTE — MRI
Exam: Brain MRI without contrast



HISTORY: Stroke.



COMPARISON: 5/23/2016



FINDINGS: 

Calvarial marrow signal intensity: Appropriate T1 signal

Gradient echo sequence: No hemorrhage

Brain parenchyma: No mass, mass effect or midline shift. Brain volume, age-appropriate.

Cortical gray-white matter differentiation: Preserved

Restricted diffusion: Central arterial flow voids are maintained. Absent restricted diffusion

White matter signal intensities: T2, FLAIR white matter hyperintensities due to chronic small vessel 
ischemic changes



Sinuses: Adequate aeration of the paranasal sinuses and mastoid air cells. 





IMPRESSION:



1. Age-appropriate brain volume loss. Chronic small vessel ischemic changes white matter.

2. Absent restricted diffusion. No acute infarct.



Reported By: Romero Cotton 

Electronically Signed:  10/29/2020 11:34 AM

## 2020-10-30 NOTE — DIS
DATE OF ADMISSION:  10/28/2020



DATE OF DISCHARGE:  10/29/2020



DISCHARGE DIAGNOSES:  

1. Bell's palsy. 

2. Diabetes.

3. Hyperlipidemia.

4. Obesity.

5. Hypertension.



CONSULTATIONS CALLED:  Neurology.



HOSPITAL COURSE:  Mr. Pereyra is a 60-year-old gentleman with past medical

history of hypertension, diabetes, Bell's palsy, and dyslipidemia.  He presented
to

emergency room on a consult for new onset right-sided droopiness and slurred 
speech

of 3 or 4 days' duration.  The patient had woken up 2 days prior to presentation
and

noted he had a headache; however, this was not associated with slurred speech.  
The

patient presented to the emergency room for further evaluation.  Upon 
presentation

to the emergency room, he was admitted.  He had CT of the brain that did not 
show

any acute findings.  He therefore went on to have an MRI of the brain that 
showed no

acute infarct.  He was also seen by Neurology who felt that the patient's 
symptoms

were due to Bell's palsy and not an acute CVA.  Recommendations were for the 
patient

to be treated with steroids and to follow up as an outpatient. 



DISCHARGE PHYSICAL EXAMINATION:  GENERAL:  Elderly gentleman, in no acute 
distress. 

HEENT:  Not pale.  No jaundice.  Pupils are equal and reactive to light and

accommodation.  Extraocular movements are intact. 

NECK:  Supple.  No JVD.  No thyromegaly.  No bruits. 

CARDIOVASCULAR SYSTEM:  First and second heart sounds are heard.  No murmurs, 
rubs,

or gallops. 

ABDOMEN:  Bowel sounds are positive.  Nondistended.  Nontender. 

EXTREMITIES:  No cyanosis.  No clubbing.  No edema. 

CENTRAL NERVOUS SYSTEM:  The patient has slight facial droop from his new 
diagnosis

of Bell's palsy. 



DISCHARGE MEDICATIONS:  The patient was discharged on prednisone 40 mg b.i.d. 
for 1

week.  He will continue his other home medications. 



FOLLOWUP:  The patient is advised to follow up with his PCP and Neurology in the

next 2 to 4 weeks. 







Job ID:  [id number]



[facility]

## 2021-02-03 ENCOUNTER — HOSPITAL ENCOUNTER (OUTPATIENT)
Dept: HOSPITAL 92 - BICRAD | Age: 61
Discharge: HOME | End: 2021-02-03
Attending: UROLOGY
Payer: MEDICARE

## 2021-02-03 DIAGNOSIS — N20.0: Primary | ICD-10-CM

## 2021-02-03 PROCEDURE — 81001 URINALYSIS AUTO W/SCOPE: CPT

## 2021-02-03 PROCEDURE — 36415 COLL VENOUS BLD VENIPUNCTURE: CPT

## 2021-02-03 PROCEDURE — 83036 HEMOGLOBIN GLYCOSYLATED A1C: CPT

## 2021-02-03 PROCEDURE — G0103 PSA SCREENING: HCPCS

## 2021-02-03 PROCEDURE — 82306 VITAMIN D 25 HYDROXY: CPT

## 2021-02-03 PROCEDURE — 80076 HEPATIC FUNCTION PANEL: CPT

## 2021-02-03 PROCEDURE — 80061 LIPID PANEL: CPT

## 2021-02-03 PROCEDURE — 74018 RADEX ABDOMEN 1 VIEW: CPT

## 2021-02-03 PROCEDURE — 80048 BASIC METABOLIC PNL TOTAL CA: CPT

## 2021-02-03 PROCEDURE — 85025 COMPLETE CBC W/AUTO DIFF WBC: CPT

## 2021-02-03 NOTE — RAD
KUB:      

 

Date:  02/03/2021

 

HISTORY:  

Renal calculi. 

 

COMPARISON:  

11/09/2020 exam. 

 

FINDINGS:

Renal outlines are obscured by overlying gas and stool, particularly the right kidney. I do not appre
ciate any definitive renal or ureteral calculi. Arthritic changes of the spine and hips are noted. 

 

IMPRESSION: 

No definitive renal calculi. 

 

 

POS: [facility]

## 2021-08-09 ENCOUNTER — HOSPITAL ENCOUNTER (OUTPATIENT)
Dept: HOSPITAL 92 - ERS | Age: 61
Setting detail: OBSERVATION
LOS: 1 days | Discharge: HOME | End: 2021-08-10
Attending: INTERNAL MEDICINE | Admitting: INTERNAL MEDICINE
Payer: MEDICARE

## 2021-08-09 VITALS — BODY MASS INDEX: 46 KG/M2

## 2021-08-09 DIAGNOSIS — I10: ICD-10-CM

## 2021-08-09 DIAGNOSIS — E11.9: ICD-10-CM

## 2021-08-09 DIAGNOSIS — Z88.0: ICD-10-CM

## 2021-08-09 DIAGNOSIS — J20.9: ICD-10-CM

## 2021-08-09 DIAGNOSIS — Z79.899: ICD-10-CM

## 2021-08-09 DIAGNOSIS — J01.40: Primary | ICD-10-CM

## 2021-08-09 DIAGNOSIS — Z79.84: ICD-10-CM

## 2021-08-09 DIAGNOSIS — Z20.822: ICD-10-CM

## 2021-08-09 DIAGNOSIS — E66.9: ICD-10-CM

## 2021-08-09 LAB
ALBUMIN SERPL BCG-MCNC: 3.8 G/DL (ref 3.4–4.8)
ALP SERPL-CCNC: 95 U/L (ref 40–110)
ALT SERPL W P-5'-P-CCNC: 27 U/L (ref 8–55)
ANION GAP SERPL CALC-SCNC: 11 MMOL/L (ref 10–20)
AST SERPL-CCNC: 33 U/L (ref 5–34)
BASOPHILS # BLD AUTO: 0 THOU/UL (ref 0–0.2)
BASOPHILS NFR BLD AUTO: 0.4 % (ref 0–1)
BILIRUB SERPL-MCNC: 0.9 MG/DL (ref 0.2–1.2)
BUN SERPL-MCNC: 10 MG/DL (ref 8.4–25.7)
CALCIUM SERPL-MCNC: 8.9 MG/DL (ref 7.8–10.44)
CHLORIDE SERPL-SCNC: 101 MMOL/L (ref 98–107)
CO2 SERPL-SCNC: 29 MMOL/L (ref 23–31)
CREAT CL PREDICTED SERPL C-G-VRATE: 0 ML/MIN (ref 70–130)
EOSINOPHIL # BLD AUTO: 0.3 THOU/UL (ref 0–0.7)
EOSINOPHIL NFR BLD AUTO: 3.8 % (ref 0–10)
GLOBULIN SER CALC-MCNC: 3.3 G/DL (ref 2.4–3.5)
GLUCOSE SERPL-MCNC: 294 MG/DL (ref 80–115)
HGB BLD-MCNC: 15.5 G/DL (ref 14–18)
LYMPHOCYTES # BLD: 1.3 THOU/UL (ref 1.2–3.4)
LYMPHOCYTES NFR BLD AUTO: 16.2 % (ref 21–51)
MCH RBC QN AUTO: 30.4 PG (ref 27–31)
MCV RBC AUTO: 91.8 FL (ref 78–98)
MONOCYTES # BLD AUTO: 0.8 THOU/UL (ref 0.11–0.59)
MONOCYTES NFR BLD AUTO: 10.2 % (ref 0–10)
NEUTROPHILS # BLD AUTO: 5.5 THOU/UL (ref 1.4–6.5)
NEUTROPHILS NFR BLD AUTO: 69.4 % (ref 42–75)
PLATELET # BLD AUTO: 123 THOU/UL (ref 130–400)
POTASSIUM SERPL-SCNC: 4.2 MMOL/L (ref 3.5–5.1)
RBC # BLD AUTO: 5.1 MILL/UL (ref 4.7–6.1)
SODIUM SERPL-SCNC: 137 MMOL/L (ref 136–145)
WBC # BLD AUTO: 8 THOU/UL (ref 4.8–10.8)

## 2021-08-09 PROCEDURE — 85025 COMPLETE CBC W/AUTO DIFF WBC: CPT

## 2021-08-09 PROCEDURE — 36415 COLL VENOUS BLD VENIPUNCTURE: CPT

## 2021-08-09 PROCEDURE — 78452 HT MUSCLE IMAGE SPECT MULT: CPT

## 2021-08-09 PROCEDURE — 93005 ELECTROCARDIOGRAM TRACING: CPT

## 2021-08-09 PROCEDURE — 0240U: CPT

## 2021-08-09 PROCEDURE — 85379 FIBRIN DEGRADATION QUANT: CPT

## 2021-08-09 PROCEDURE — 99285 EMERGENCY DEPT VISIT HI MDM: CPT

## 2021-08-09 PROCEDURE — 84484 ASSAY OF TROPONIN QUANT: CPT

## 2021-08-09 PROCEDURE — 36416 COLLJ CAPILLARY BLOOD SPEC: CPT

## 2021-08-09 PROCEDURE — 71045 X-RAY EXAM CHEST 1 VIEW: CPT

## 2021-08-09 PROCEDURE — 94664 DEMO&/EVAL PT USE INHALER: CPT

## 2021-08-09 PROCEDURE — A9500 TC99M SESTAMIBI: HCPCS

## 2021-08-09 PROCEDURE — 83880 ASSAY OF NATRIURETIC PEPTIDE: CPT

## 2021-08-09 PROCEDURE — G0378 HOSPITAL OBSERVATION PER HR: HCPCS

## 2021-08-09 PROCEDURE — 93017 CV STRESS TEST TRACING ONLY: CPT

## 2021-08-09 PROCEDURE — 82962 GLUCOSE BLOOD TEST: CPT

## 2021-08-09 PROCEDURE — 71275 CT ANGIOGRAPHY CHEST: CPT

## 2021-08-09 PROCEDURE — 94640 AIRWAY INHALATION TREATMENT: CPT

## 2021-08-09 PROCEDURE — 80053 COMPREHEN METABOLIC PANEL: CPT

## 2021-08-09 PROCEDURE — 96372 THER/PROPH/DIAG INJ SC/IM: CPT

## 2021-08-10 VITALS — SYSTOLIC BLOOD PRESSURE: 162 MMHG | DIASTOLIC BLOOD PRESSURE: 92 MMHG

## 2021-08-10 VITALS — TEMPERATURE: 98.1 F

## 2021-08-10 LAB
TROPONIN I SERPL DL<=0.01 NG/ML-MCNC: (no result) NG/ML (ref ?–0.03)
TROPONIN I SERPL DL<=0.01 NG/ML-MCNC: (no result) NG/ML (ref ?–0.03)

## 2022-03-24 ENCOUNTER — HOSPITAL ENCOUNTER (EMERGENCY)
Dept: HOSPITAL 92 - CSHERS | Age: 62
Discharge: HOME | End: 2022-03-24
Payer: MEDICARE
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
## 2022-03-24 DIAGNOSIS — Z79.899: ICD-10-CM

## 2022-03-24 DIAGNOSIS — I10: ICD-10-CM

## 2022-03-24 DIAGNOSIS — M25.472: ICD-10-CM

## 2022-03-24 DIAGNOSIS — Z79.84: ICD-10-CM

## 2022-03-24 DIAGNOSIS — E11.9: ICD-10-CM

## 2022-03-24 DIAGNOSIS — S80.02XA: Primary | ICD-10-CM

## 2022-05-05 ENCOUNTER — HOSPITAL ENCOUNTER (OUTPATIENT)
Dept: HOSPITAL 92 - SCSRAD | Age: 62
Discharge: HOME | End: 2022-05-05
Attending: NURSE PRACTITIONER
Payer: MEDICARE

## 2022-05-05 DIAGNOSIS — M54.50: Primary | ICD-10-CM

## 2022-05-05 PROCEDURE — 72202 X-RAY EXAM SI JOINTS 3/> VWS: CPT

## 2022-05-08 ENCOUNTER — HOSPITAL ENCOUNTER (EMERGENCY)
Dept: HOSPITAL 92 - CSHERS | Age: 62
Discharge: HOME | End: 2022-05-08
Payer: COMMERCIAL

## 2022-05-08 DIAGNOSIS — E11.9: ICD-10-CM

## 2022-05-08 DIAGNOSIS — G51.0: ICD-10-CM

## 2022-05-08 DIAGNOSIS — I45.10: ICD-10-CM

## 2022-05-08 DIAGNOSIS — I49.3: ICD-10-CM

## 2022-05-08 DIAGNOSIS — Z79.899: ICD-10-CM

## 2022-05-08 DIAGNOSIS — Z79.84: ICD-10-CM

## 2022-05-08 DIAGNOSIS — E78.00: ICD-10-CM

## 2022-05-08 DIAGNOSIS — R42: Primary | ICD-10-CM

## 2022-05-08 DIAGNOSIS — Z86.73: ICD-10-CM

## 2022-05-08 DIAGNOSIS — Z87.442: ICD-10-CM

## 2022-05-08 DIAGNOSIS — E78.5: ICD-10-CM

## 2022-05-08 DIAGNOSIS — I10: ICD-10-CM
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
## 2022-05-08 LAB
ALBUMIN SERPL BCG-MCNC: 3.7 G/DL (ref 3.4–4.8)
ALP SERPL-CCNC: 74 U/L (ref 40–110)
ALT SERPL W P-5'-P-CCNC: 39 U/L (ref 8–55)
ANION GAP SERPL CALC-SCNC: 14 MMOL/L (ref 10–20)
AST SERPL-CCNC: 60 U/L (ref 5–34)
BASOPHILS # BLD AUTO: 0.1 10X3/UL (ref 0–0.2)
BASOPHILS NFR BLD AUTO: 0.8 % (ref 0–2)
BILIRUB SERPL-MCNC: 0.9 MG/DL (ref 0.2–1.2)
BUN SERPL-MCNC: 12 MG/DL (ref 8.4–25.7)
CALCIUM SERPL-MCNC: 9 MG/DL (ref 7.8–10.44)
CHLORIDE SERPL-SCNC: 103 MMOL/L (ref 98–107)
CO2 SERPL-SCNC: 28 MMOL/L (ref 23–31)
CREAT CL PREDICTED SERPL C-G-VRATE: 0 ML/MIN (ref 70–130)
EOSINOPHIL # BLD AUTO: 0.1 10X3/UL (ref 0–0.5)
EOSINOPHIL NFR BLD AUTO: 1.8 % (ref 0–6)
GLOBULIN SER CALC-MCNC: 2.8 G/DL (ref 2.4–3.5)
GLUCOSE SERPL-MCNC: 159 MG/DL (ref 80–115)
HGB BLD-MCNC: 14.5 G/DL (ref 13.5–17.5)
LYMPHOCYTES NFR BLD AUTO: 18.6 % (ref 18–47)
MCH RBC QN AUTO: 29.2 PG (ref 27–33)
MCV RBC AUTO: 89.3 FL (ref 81.2–95.1)
MONOCYTES # BLD AUTO: 0.6 10X3/UL (ref 0–1.1)
MONOCYTES NFR BLD AUTO: 7.6 % (ref 0–10)
NEUTROPHILS # BLD AUTO: 5.3 10X3/UL (ref 1.5–8.4)
NEUTROPHILS NFR BLD AUTO: 70.9 % (ref 40–75)
PLATELET # BLD AUTO: 151 10X3/UL (ref 150–450)
POTASSIUM SERPL-SCNC: 3.8 MMOL/L (ref 3.5–5.1)
RBC # BLD AUTO: 4.97 10X6/UL (ref 4.32–5.72)
SODIUM SERPL-SCNC: 141 MMOL/L (ref 136–145)
WBC # BLD AUTO: 7.4 10X3/UL (ref 3.5–10.5)

## 2022-05-08 PROCEDURE — 80053 COMPREHEN METABOLIC PANEL: CPT

## 2022-05-08 PROCEDURE — 70450 CT HEAD/BRAIN W/O DYE: CPT

## 2022-05-08 PROCEDURE — 85025 COMPLETE CBC W/AUTO DIFF WBC: CPT

## 2022-05-08 PROCEDURE — 93005 ELECTROCARDIOGRAM TRACING: CPT

## 2022-05-08 PROCEDURE — 84484 ASSAY OF TROPONIN QUANT: CPT

## 2023-01-30 ENCOUNTER — HOSPITAL ENCOUNTER (OUTPATIENT)
Dept: HOSPITAL 92 - SCSRAD | Age: 63
Discharge: HOME | End: 2023-01-30
Attending: FAMILY MEDICINE
Payer: MEDICARE

## 2023-01-30 DIAGNOSIS — M17.12: ICD-10-CM

## 2023-01-30 DIAGNOSIS — M25.561: Primary | ICD-10-CM

## 2023-01-30 DIAGNOSIS — M25.562: ICD-10-CM

## 2023-06-15 ENCOUNTER — HOSPITAL ENCOUNTER (OUTPATIENT)
Dept: HOSPITAL 92 - LABBT | Age: 63
Discharge: HOME | End: 2023-06-15
Attending: ORTHOPAEDIC SURGERY
Payer: MEDICARE

## 2023-06-15 DIAGNOSIS — Z01.818: Primary | ICD-10-CM

## 2023-06-15 DIAGNOSIS — M17.12: ICD-10-CM

## 2023-06-15 LAB
ANION GAP SERPL CALC-SCNC: 14 MMOL/L (ref 10–20)
BASOPHILS # BLD AUTO: 0.1 10X3/UL (ref 0–0.2)
BASOPHILS NFR BLD AUTO: 1.3 % (ref 0–2)
BUN SERPL-MCNC: 14 MG/DL (ref 8.4–25.7)
CALCIUM SERPL-MCNC: 9.5 MG/DL (ref 7.8–10.44)
CHLORIDE SERPL-SCNC: 106 MMOL/L (ref 98–107)
CO2 SERPL-SCNC: 24 MMOL/L (ref 23–31)
CREAT CL PREDICTED SERPL C-G-VRATE: 0 ML/MIN (ref 70–130)
EOSINOPHIL # BLD AUTO: 0.3 10X3/UL (ref 0–0.5)
EOSINOPHIL NFR BLD AUTO: 4.6 % (ref 0–6)
GLUCOSE SERPL-MCNC: 115 MG/DL (ref 80–115)
HGB BLD-MCNC: 15.7 G/DL (ref 13.5–17.5)
INR PPP: 1
LYMPHOCYTES NFR BLD AUTO: 24.8 % (ref 18–47)
MCH RBC QN AUTO: 29.2 PG (ref 27–33)
MCV RBC AUTO: 88.3 FL (ref 81.2–95.1)
MONOCYTES # BLD AUTO: 0.6 10X3/UL (ref 0–1.1)
MONOCYTES NFR BLD AUTO: 9.5 % (ref 0–10)
NEUTROPHILS # BLD AUTO: 3.7 10X3/UL (ref 1.5–8.4)
NEUTROPHILS NFR BLD AUTO: 59.5 % (ref 40–75)
PLATELET # BLD AUTO: 158 10X3/UL (ref 150–450)
POTASSIUM SERPL-SCNC: 3.9 MMOL/L (ref 3.5–5.1)
PROTHROMBIN TIME: 10.9 SEC (ref 9.5–12.1)
RBC # BLD AUTO: 5.37 10X6/UL (ref 4.32–5.72)
SODIUM SERPL-SCNC: 140 MMOL/L (ref 136–145)
WBC # BLD AUTO: 6.3 10X3/UL (ref 3.5–10.5)

## 2023-06-15 PROCEDURE — 87081 CULTURE SCREEN ONLY: CPT

## 2023-06-15 PROCEDURE — 85025 COMPLETE CBC W/AUTO DIFF WBC: CPT

## 2023-06-15 PROCEDURE — 80048 BASIC METABOLIC PNL TOTAL CA: CPT

## 2023-06-15 PROCEDURE — 85610 PROTHROMBIN TIME: CPT

## 2023-06-15 PROCEDURE — 93010 ELECTROCARDIOGRAM REPORT: CPT

## 2023-06-15 PROCEDURE — 93005 ELECTROCARDIOGRAM TRACING: CPT

## 2023-06-20 ENCOUNTER — HOSPITAL ENCOUNTER (OUTPATIENT)
Dept: HOSPITAL 92 - SDC | Age: 63
Setting detail: OBSERVATION
LOS: 1 days | Discharge: HOME | End: 2023-06-21
Attending: ORTHOPAEDIC SURGERY | Admitting: ORTHOPAEDIC SURGERY
Payer: MEDICARE

## 2023-06-20 VITALS — BODY MASS INDEX: 38.4 KG/M2

## 2023-06-20 DIAGNOSIS — I34.0: ICD-10-CM

## 2023-06-20 DIAGNOSIS — Z79.899: ICD-10-CM

## 2023-06-20 DIAGNOSIS — N20.0: ICD-10-CM

## 2023-06-20 DIAGNOSIS — M17.0: Primary | ICD-10-CM

## 2023-06-20 DIAGNOSIS — Z86.16: ICD-10-CM

## 2023-06-20 DIAGNOSIS — Z88.0: ICD-10-CM

## 2023-06-20 DIAGNOSIS — I11.0: ICD-10-CM

## 2023-06-20 DIAGNOSIS — E55.9: ICD-10-CM

## 2023-06-20 DIAGNOSIS — G47.33: ICD-10-CM

## 2023-06-20 DIAGNOSIS — I50.30: ICD-10-CM

## 2023-06-20 DIAGNOSIS — E78.5: ICD-10-CM

## 2023-06-20 PROCEDURE — 97530 THERAPEUTIC ACTIVITIES: CPT

## 2023-06-20 PROCEDURE — 97116 GAIT TRAINING THERAPY: CPT

## 2023-06-20 PROCEDURE — 36416 COLLJ CAPILLARY BLOOD SPEC: CPT

## 2023-06-20 PROCEDURE — C1776 JOINT DEVICE (IMPLANTABLE): HCPCS

## 2023-06-20 PROCEDURE — A4306 DRUG DELIVERY SYSTEM <=50 ML: HCPCS

## 2023-06-20 PROCEDURE — 0SRD0JZ REPLACEMENT OF LEFT KNEE JOINT WITH SYNTHETIC SUBSTITUTE, OPEN APPROACH: ICD-10-PCS | Performed by: ORTHOPAEDIC SURGERY

## 2023-06-20 PROCEDURE — 85027 COMPLETE CBC AUTOMATED: CPT

## 2023-06-20 PROCEDURE — 97110 THERAPEUTIC EXERCISES: CPT

## 2023-06-20 PROCEDURE — 96366 THER/PROPH/DIAG IV INF ADDON: CPT

## 2023-06-20 PROCEDURE — G0378 HOSPITAL OBSERVATION PER HR: HCPCS

## 2023-06-20 PROCEDURE — 36415 COLL VENOUS BLD VENIPUNCTURE: CPT

## 2023-06-20 PROCEDURE — 27447 TOTAL KNEE ARTHROPLASTY: CPT

## 2023-06-20 PROCEDURE — 96375 TX/PRO/DX INJ NEW DRUG ADDON: CPT

## 2023-06-20 PROCEDURE — 96365 THER/PROPH/DIAG IV INF INIT: CPT

## 2023-06-20 PROCEDURE — S0020 INJECTION, BUPIVICAINE HYDRO: HCPCS

## 2023-06-20 PROCEDURE — 73560 X-RAY EXAM OF KNEE 1 OR 2: CPT

## 2023-06-20 PROCEDURE — 96376 TX/PRO/DX INJ SAME DRUG ADON: CPT

## 2023-06-20 PROCEDURE — 82962 GLUCOSE BLOOD TEST: CPT

## 2023-06-20 RX ADMIN — CLINDAMYCIN PHOSPHATE SCH: 600 INJECTION, SOLUTION INTRAVENOUS at 16:00

## 2023-06-20 RX ADMIN — HYDROCODONE BITARTRATE AND ACETAMINOPHEN PRN TAB: 10; 325 TABLET ORAL at 21:46

## 2023-06-20 RX ADMIN — ASPIRIN SCH MG: 81 TABLET ORAL at 21:45

## 2023-06-20 RX ADMIN — CLINDAMYCIN PHOSPHATE SCH MLS: 600 INJECTION, SOLUTION INTRAVENOUS at 21:44

## 2023-06-21 VITALS — SYSTOLIC BLOOD PRESSURE: 131 MMHG | TEMPERATURE: 98 F | DIASTOLIC BLOOD PRESSURE: 77 MMHG

## 2023-06-21 LAB
HGB BLD-MCNC: 12.5 G/DL (ref 14–18)
MCH RBC QN AUTO: 30 PG (ref 27–31)
MCV RBC AUTO: 92.1 FL (ref 78–98)
PLATELET # BLD AUTO: 123 10X3/UL (ref 130–400)
RBC # BLD AUTO: 4.17 MILL/UL (ref 4.7–6.1)
WBC # BLD AUTO: 8.7 10X3/UL (ref 4.8–10.8)

## 2023-06-21 RX ADMIN — HYDROCODONE BITARTRATE AND ACETAMINOPHEN PRN TAB: 10; 325 TABLET ORAL at 05:14

## 2023-06-21 RX ADMIN — ASPIRIN SCH MG: 81 TABLET ORAL at 09:03

## 2023-06-21 RX ADMIN — CLINDAMYCIN PHOSPHATE SCH MLS: 600 INJECTION, SOLUTION INTRAVENOUS at 05:13

## 2023-06-21 RX ADMIN — HYDROCODONE BITARTRATE AND ACETAMINOPHEN PRN TAB: 10; 325 TABLET ORAL at 09:27

## 2023-12-07 ENCOUNTER — HOSPITAL ENCOUNTER (OUTPATIENT)
Dept: HOSPITAL 92 - SCSMRI | Age: 63
Discharge: HOME | End: 2023-12-07
Attending: ORTHOPAEDIC SURGERY
Payer: MEDICARE

## 2023-12-07 DIAGNOSIS — M51.36: ICD-10-CM

## 2023-12-07 DIAGNOSIS — M48.061: Primary | ICD-10-CM

## 2023-12-07 DIAGNOSIS — M48.07: ICD-10-CM

## 2023-12-07 DIAGNOSIS — M51.37: ICD-10-CM

## 2023-12-07 PROCEDURE — 72148 MRI LUMBAR SPINE W/O DYE: CPT
